# Patient Record
Sex: MALE | Race: WHITE | NOT HISPANIC OR LATINO | URBAN - METROPOLITAN AREA
[De-identification: names, ages, dates, MRNs, and addresses within clinical notes are randomized per-mention and may not be internally consistent; named-entity substitution may affect disease eponyms.]

---

## 2017-12-12 ENCOUNTER — EMERGENCY (EMERGENCY)
Facility: HOSPITAL | Age: 47
LOS: 1 days | Discharge: HOME | End: 2017-12-12
Attending: PERSONAL EMERGENCY RESPONSE ATTENDANT | Admitting: PERSONAL EMERGENCY RESPONSE ATTENDANT
Payer: SELF-PAY

## 2017-12-12 VITALS
OXYGEN SATURATION: 99 % | RESPIRATION RATE: 18 BRPM | TEMPERATURE: 99 F | DIASTOLIC BLOOD PRESSURE: 85 MMHG | SYSTOLIC BLOOD PRESSURE: 135 MMHG | HEART RATE: 60 BPM

## 2017-12-12 PROCEDURE — 73030 X-RAY EXAM OF SHOULDER: CPT

## 2017-12-12 PROCEDURE — 99284 EMERGENCY DEPT VISIT MOD MDM: CPT

## 2017-12-12 PROCEDURE — 99283 EMERGENCY DEPT VISIT LOW MDM: CPT | Mod: 25

## 2017-12-12 PROCEDURE — 73030 X-RAY EXAM OF SHOULDER: CPT | Mod: 26,RT

## 2017-12-12 RX ORDER — IBUPROFEN 200 MG
600 TABLET ORAL ONCE
Qty: 0 | Refills: 0 | Status: COMPLETED | OUTPATIENT
Start: 2017-12-12 | End: 2017-12-12

## 2017-12-12 RX ADMIN — Medication 600 MILLIGRAM(S): at 13:00

## 2017-12-12 RX ADMIN — Medication 600 MILLIGRAM(S): at 12:20

## 2017-12-12 NOTE — ED PROVIDER NOTE - PLAN OF CARE
1) Please follow-up with your Primary Medical Doctor in 3-5 days. If you need to find a new physician, please call [phone]. See the Orthopedist, the number is in your discharge insturctions, or you can see your own Ortho doctor  2) Return to the Emergency Department if you experiences: numbness, tingling, inability to move the arm, worsening pain, weakness, or symptoms that are new or recurrent.  3) If you have any questions or concerns, do not hesitate to contact us at [phone].  4) To alleviate the pain, please rest and take Tylenol 650 mg or Motrin 400 mg once every 6 hours as needed.

## 2017-12-12 NOTE — ED PROVIDER NOTE - ATTENDING CONTRIBUTION TO CARE
Attending MD Felix.  Agree with above.  PT is a 47 yr old male presenting to ED with complaint of slip and fall onto bialteral extended upper extremtiies.  Recurrent dislocations of his L shoulder and presented today holding and unable to range his RUE.  Pt endorses mild persistent R shoulder pain that is markedly improved since he 'popped it' and it feels better.  LUE is able to fully range and non-tender.  Now also able to fully range RUE at all joints without severe pain.  NO snuffbox TTP bilaterally.  Pt is otherwise healthy.  No head injury/LOC. Pt is able to passively range RUE entirely at R shoulder.  No snuffbox TTP bilaterally.  LUE able to fully range without pain.  Pt placed in RUE sling for comfort/support.  He has an orthopedist with whom he can follow who has seen pt for his previous L shoulder dislocations.  Stable for discharge to follow-up as outpt.  Pt encouraged to wear sling for sev'l days to encourage return of shoulder tendons/ligaments/muscles to normal position and reduce risk of recurrent dislocation.  Remains nvsc intact in bilateral UE's at time of discharge.

## 2017-12-12 NOTE — ED PROVIDER NOTE - PHYSICAL EXAMINATION
Physical Exam: middle aged M who is in NAD, AAOx3, cooperative with examination, non-labored breathing, CTAB bilaterally, normal heart rate, normal peripheral perfusion, No focal motor or sensory deficits. MSK R arm with + TTP to the deltoid, no A/C TTP, normal sensation to the axillary nerve and distal to BUE in all dermatomes, normal ROM of the L shoulder, normal distal pulses ~ Nabil Lu MD

## 2017-12-12 NOTE — ED PROVIDER NOTE - MEDICAL DECISION MAKING DETAILS
Nabil Lu MD (resident): R shoulder pain after slip and fall, reported to be dislocated prior to ED, but self relocated prior to being seen. Will get XR to eval for Fx/dislocation. Pain medication and sling.

## 2017-12-12 NOTE — ED PROVIDER NOTE - PROGRESS NOTE DETAILS
Nabil Lu MD (resident): XR reviewed, pt able to range their shoulder. placed in sling and given f/u instructions w/ ortho (he also has his own ortho)

## 2017-12-12 NOTE — ED PROVIDER NOTE - NS ED ROS FT
Review of Systems: no syncope, + R shoulder pain, no shortness of breath, no loss of consciousness. ~ Nabil Lu MD
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
## 2017-12-12 NOTE — ED PROVIDER NOTE - CARE PLAN
Principal Discharge DX:	Acute pain of right shoulder  Secondary Diagnosis:	Shoulder dislocation, right, subsequent encounter Principal Discharge DX:	Acute pain of right shoulder  Instructions for follow-up, activity and diet:	1) Please follow-up with your Primary Medical Doctor in 3-5 days. If you need to find a new physician, please call [phone]. See the Orthopedist, the number is in your discharge insturctions, or you can see your own Ortho doctor  2) Return to the Emergency Department if you experiences: numbness, tingling, inability to move the arm, worsening pain, weakness, or symptoms that are new or recurrent.  3) If you have any questions or concerns, do not hesitate to contact us at [phone].  4) To alleviate the pain, please rest and take Tylenol 650 mg or Motrin 400 mg once every 6 hours as needed.  Secondary Diagnosis:	Shoulder dislocation, right, subsequent encounter
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
